# Patient Record
Sex: FEMALE
[De-identification: names, ages, dates, MRNs, and addresses within clinical notes are randomized per-mention and may not be internally consistent; named-entity substitution may affect disease eponyms.]

---

## 2024-02-12 ENCOUNTER — APPOINTMENT (OUTPATIENT)
Dept: OBGYN | Facility: CLINIC | Age: 44
End: 2024-02-12

## 2024-03-18 PROBLEM — Z00.00 ENCOUNTER FOR PREVENTIVE HEALTH EXAMINATION: Status: ACTIVE | Noted: 2024-03-18

## 2024-03-21 ENCOUNTER — APPOINTMENT (OUTPATIENT)
Dept: OBGYN | Facility: CLINIC | Age: 44
End: 2024-03-21
Payer: COMMERCIAL

## 2024-03-21 VITALS
SYSTOLIC BLOOD PRESSURE: 118 MMHG | HEART RATE: 79 BPM | BODY MASS INDEX: 24.11 KG/M2 | WEIGHT: 150 LBS | HEIGHT: 66 IN | DIASTOLIC BLOOD PRESSURE: 71 MMHG

## 2024-03-21 DIAGNOSIS — B97.7 PAPILLOMAVIRUS AS THE CAUSE OF DISEASES CLASSIFIED ELSEWHERE: ICD-10-CM

## 2024-03-21 DIAGNOSIS — R39.15 URGENCY OF URINATION: ICD-10-CM

## 2024-03-21 DIAGNOSIS — N89.8 OTHER SPECIFIED NONINFLAMMATORY DISORDERS OF VAGINA: ICD-10-CM

## 2024-03-21 DIAGNOSIS — Z78.9 OTHER SPECIFIED HEALTH STATUS: ICD-10-CM

## 2024-03-21 DIAGNOSIS — Z82.49 FAMILY HISTORY OF ISCHEMIC HEART DISEASE AND OTHER DISEASES OF THE CIRCULATORY SYSTEM: ICD-10-CM

## 2024-03-21 DIAGNOSIS — N39.3 STRESS INCONTINENCE (FEMALE) (MALE): ICD-10-CM

## 2024-03-21 DIAGNOSIS — Z01.419 ENCOUNTER FOR GYNECOLOGICAL EXAMINATION (GENERAL) (ROUTINE) W/OUT ABNORMAL FINDINGS: ICD-10-CM

## 2024-03-21 PROCEDURE — 99386 PREV VISIT NEW AGE 40-64: CPT

## 2024-03-24 PROBLEM — N39.3 STRESS INCONTINENCE OF URINE: Status: ACTIVE | Noted: 2024-03-24

## 2024-03-24 NOTE — PHYSICAL EXAM
[Alert] : alert [Appropriately responsive] : appropriately responsive [No Lymphadenopathy] : no lymphadenopathy [No Acute Distress] : no acute distress [Soft] : soft [Non-distended] : non-distended [Non-tender] : non-tender [No HSM] : No HSM [No Lesions] : no lesions [No Mass] : no mass [Oriented x3] : oriented x3 [Examination Of The Breasts] : a normal appearance [No Discharge] : no discharge [No Masses] : no breast masses were palpable [Labia Majora] : normal [Labia Minora] : normal [Normal] : normal [Uterine Adnexae] : normal [Cystocele] : a cystocele [Rectocele] : a rectocele

## 2024-03-24 NOTE — DISCUSSION/SUMMARY
[FreeTextEntry1] : 43-year-old P 3 annual GYN, HOLGER, cystocele Pap HPV Mammogram urogyn referral fro HOLGER - bulkamed vs tvt/tot d/w pt replacement iud next years

## 2024-03-24 NOTE — HISTORY OF PRESENT ILLNESS
[Patient reported PAP Smear was normal] : Patient reported PAP Smear was normal [N] : Patient reports normal menses [Y] : Patient is sexually active [TextBox_4] : GYNHX No history of fibroids, cysts, or STDs ParaGard IUD 9 years Last Pap 2023 normal [Mammogramdate] : 2021 [PapSmeardate] : 2023 [ColonoscopyDate] : never [LMPDate] : 2-1-2024 [PGHxTotal] : 3 [[facility]] : 3 [[facility] Center[facility]] : 3 [FreeTextEntry1] :

## 2024-03-26 LAB — HPV HIGH+LOW RISK DNA PNL CVX: NOT DETECTED

## 2024-03-28 LAB
A VAGINAE DNA VAG QL NAA+PROBE: NORMAL
BVAB2 DNA VAG QL NAA+PROBE: NORMAL
C KRUSEI DNA VAG QL NAA+PROBE: NEGATIVE
CANDIDA DNA VAG QL NAA+PROBE: NEGATIVE
MEGA1 DNA VAG QL NAA+PROBE: NORMAL
T VAGINALIS RRNA SPEC QL NAA+PROBE: NORMAL

## 2024-04-09 ENCOUNTER — NON-APPOINTMENT (OUTPATIENT)
Age: 44
End: 2024-04-09

## 2024-04-17 ENCOUNTER — APPOINTMENT (OUTPATIENT)
Dept: OBGYN | Facility: CLINIC | Age: 44
End: 2024-04-17

## 2024-06-17 ENCOUNTER — NON-APPOINTMENT (OUTPATIENT)
Age: 44
End: 2024-06-17

## 2024-06-18 ENCOUNTER — APPOINTMENT (OUTPATIENT)
Dept: OBGYN | Facility: CLINIC | Age: 44
End: 2024-06-18
Payer: COMMERCIAL

## 2024-06-18 ENCOUNTER — ASOB RESULT (OUTPATIENT)
Age: 44
End: 2024-06-18

## 2024-06-18 DIAGNOSIS — N92.6 IRREGULAR MENSTRUATION, UNSPECIFIED: ICD-10-CM

## 2024-06-18 DIAGNOSIS — R87.615 UNSATISFACTORY CYTOLOGIC SMEAR OF CERVIX: ICD-10-CM

## 2024-06-18 DIAGNOSIS — B00.9 HERPESVIRAL INFECTION, UNSPECIFIED: ICD-10-CM

## 2024-06-18 PROCEDURE — ZZZZZ: CPT

## 2024-06-18 PROCEDURE — 76830 TRANSVAGINAL US NON-OB: CPT

## 2024-06-18 PROCEDURE — 99213 OFFICE O/P EST LOW 20 MIN: CPT | Mod: NC,25

## 2024-06-18 RX ORDER — VALACYCLOVIR 500 MG/1
500 TABLET, FILM COATED ORAL TWICE DAILY
Qty: 60 | Refills: 2 | Status: ACTIVE | COMMUNITY
Start: 2024-06-18 | End: 1900-01-01

## 2024-06-22 LAB
ANTI-MUELLERIAN HORMONE: 0.03 NG/ML
ESTRADIOL SERPL-MCNC: 54 PG/ML
FSH SERPL-MCNC: 48 IU/L
LH SERPL-ACNC: 45 IU/L
PROGEST SERPL-MCNC: 0.39 NG/ML
PROLACTIN SERPL-MCNC: 6.8 NG/ML
SHBG SERPL-SCNC: 64.6 NMOL/L
T3FREE SERPL-MCNC: 2.63 PG/ML
T4 FREE SERPL-MCNC: 1.1 NG/DL
TESTOST FREE SERPL-MCNC: 0.1 PG/ML
TESTOST SERPL-MCNC: 14 NG/DL
THYROGLOB AB SERPL-ACNC: <20 IU/ML
THYROPEROXIDASE AB SERPL IA-ACNC: 13 IU/ML
TSH SERPL-ACNC: 1.99 UIU/ML

## 2024-06-23 NOTE — DISCUSSION/SUMMARY
[FreeTextEntry1] : 43 yo p3 irregular cycles, hsv outbreaks, unsatisfactory pap for repap -valtrex 500mg bid -hormonal w/up  - pap ,

## 2024-06-23 NOTE — HISTORY OF PRESENT ILLNESS
[FreeTextEntry1] : 42 yo p 3  Patient is here for repeat Pap due to unsatisfactory  smear. irregular periods . recurrent hsv outbreaks with paraguard iud,  [TextBox_4] : GYNHX No history of fibroids, cysts, or STDs

## 2024-06-24 LAB
ANDROST SERPL-MCNC: 63 NG/DL
CYTOLOGY CVX/VAG DOC THIN PREP: NORMAL
DHEA SERPL-MCNC: 183 NG/DL

## 2024-07-15 ENCOUNTER — APPOINTMENT (OUTPATIENT)
Dept: UROGYNECOLOGY | Facility: CLINIC | Age: 44
End: 2024-07-15
Payer: COMMERCIAL

## 2024-07-15 VITALS
BODY MASS INDEX: 24.43 KG/M2 | HEART RATE: 65 BPM | WEIGHT: 152 LBS | SYSTOLIC BLOOD PRESSURE: 113 MMHG | HEIGHT: 66 IN | DIASTOLIC BLOOD PRESSURE: 72 MMHG

## 2024-07-15 DIAGNOSIS — N39.3 STRESS INCONTINENCE (FEMALE) (MALE): ICD-10-CM

## 2024-07-15 DIAGNOSIS — N94.10 UNSPECIFIED DYSPAREUNIA: ICD-10-CM

## 2024-07-15 DIAGNOSIS — Z87.448 PERSONAL HISTORY OF OTHER DISEASES OF URINARY SYSTEM: ICD-10-CM

## 2024-07-15 DIAGNOSIS — N39.41 URGE INCONTINENCE: ICD-10-CM

## 2024-07-15 DIAGNOSIS — N81.10 CYSTOCELE, UNSPECIFIED: ICD-10-CM

## 2024-07-15 PROCEDURE — 51701 INSERT BLADDER CATHETER: CPT

## 2024-07-15 PROCEDURE — 99459 PELVIC EXAMINATION: CPT

## 2024-07-15 PROCEDURE — 99205 OFFICE O/P NEW HI 60 MIN: CPT | Mod: 25

## 2024-07-15 RX ORDER — ELECTROLYTES/DEXTROSE
SOLUTION, ORAL ORAL
Refills: 0 | Status: ACTIVE | COMMUNITY

## 2024-07-16 LAB
APPEARANCE: CLEAR
BILIRUBIN URINE: NEGATIVE
BLOOD URINE: NEGATIVE
COLOR: YELLOW
GLUCOSE QUALITATIVE U: NEGATIVE MG/DL
KETONES URINE: NEGATIVE MG/DL
LEUKOCYTE ESTERASE URINE: NEGATIVE
NITRITE URINE: NEGATIVE
PH URINE: 7
PROTEIN URINE: NEGATIVE MG/DL
SPECIFIC GRAVITY URINE: 1.01
UROBILINOGEN URINE: 0.2 MG/DL

## 2024-07-18 LAB — URINE CULTURE <10: NORMAL

## 2024-09-12 ENCOUNTER — RESULT CHARGE (OUTPATIENT)
Age: 44
End: 2024-09-12

## 2024-09-12 ENCOUNTER — APPOINTMENT (OUTPATIENT)
Dept: UROGYNECOLOGY | Facility: CLINIC | Age: 44
End: 2024-09-12
Payer: COMMERCIAL

## 2024-09-12 VITALS
SYSTOLIC BLOOD PRESSURE: 94 MMHG | HEIGHT: 66 IN | WEIGHT: 152 LBS | DIASTOLIC BLOOD PRESSURE: 60 MMHG | BODY MASS INDEX: 24.43 KG/M2 | HEART RATE: 68 BPM

## 2024-09-12 DIAGNOSIS — N39.3 STRESS INCONTINENCE (FEMALE) (MALE): ICD-10-CM

## 2024-09-12 PROCEDURE — 51729 CYSTOMETROGRAM W/VP&UP: CPT

## 2024-09-12 PROCEDURE — 81000 URINALYSIS NONAUTO W/SCOPE: CPT

## 2024-09-12 PROCEDURE — 51784 ANAL/URINARY MUSCLE STUDY: CPT

## 2024-09-12 PROCEDURE — 51741 ELECTRO-UROFLOWMETRY FIRST: CPT

## 2024-09-12 PROCEDURE — 81025 URINE PREGNANCY TEST: CPT

## 2024-09-12 PROCEDURE — 51797 INTRAABDOMINAL PRESSURE TEST: CPT

## 2024-09-13 LAB
BILIRUB UR QL STRIP: NEGATIVE
CLARITY UR: CLEAR
COLLECTION METHOD: NORMAL
GLUCOSE UR-MCNC: NEGATIVE
HCG UR QL: 0.2 EU/DL
HCG UR QL: NEGATIVE
HGB UR QL STRIP.AUTO: NEGATIVE
KETONES UR-MCNC: NEGATIVE
LEUKOCYTE ESTERASE UR QL STRIP: NEGATIVE
NITRITE UR QL STRIP: NEGATIVE
PH UR STRIP: 7
PROT UR STRIP-MCNC: NEGATIVE
QUALITY CONTROL: YES
SP GR UR STRIP: 1.01

## 2024-09-18 ENCOUNTER — APPOINTMENT (OUTPATIENT)
Dept: UROGYNECOLOGY | Facility: CLINIC | Age: 44
End: 2024-09-18
Payer: COMMERCIAL

## 2024-09-18 VITALS
HEART RATE: 68 BPM | SYSTOLIC BLOOD PRESSURE: 106 MMHG | HEIGHT: 66 IN | BODY MASS INDEX: 24.43 KG/M2 | WEIGHT: 152 LBS | DIASTOLIC BLOOD PRESSURE: 72 MMHG

## 2024-09-18 DIAGNOSIS — N39.3 STRESS INCONTINENCE (FEMALE) (MALE): ICD-10-CM

## 2024-09-18 DIAGNOSIS — N39.41 URGE INCONTINENCE: ICD-10-CM

## 2024-09-18 DIAGNOSIS — R39.15 URGENCY OF URINATION: ICD-10-CM

## 2024-09-18 DIAGNOSIS — N81.10 CYSTOCELE, UNSPECIFIED: ICD-10-CM

## 2024-09-18 PROCEDURE — 99215 OFFICE O/P EST HI 40 MIN: CPT

## 2024-09-18 PROCEDURE — G2211 COMPLEX E/M VISIT ADD ON: CPT | Mod: NC

## 2024-09-23 RX ORDER — COPPER 313.4 MG/1
INTRAUTERINE DEVICE INTRAUTERINE
Qty: 1 | Refills: 0 | Status: ACTIVE | COMMUNITY
Start: 2024-09-13

## 2024-09-23 NOTE — COUNSELING
[FreeTextEntry1] : 1.Your surgery will be as follows: midurethral sling, cystoscopy, anterior repair, posterior repair 2. Your surgery will be done through the vagina 3. You will need to have bloodwork done before your surgery (we will give you more instructions regarding this). 4. After surgery, you can take Motrin 600mg every 6 hours and Tylenol 650mg every 6 hours. If these don't alleviate your pain, you can also take the stronger prescribed pain medication as instructed. 5. Please use Miralax to avoid constipation after your surgery. Start the day after surgery and use a capful each day until you see me for your postoperative visit.  6. No heavy lifting and nothing in the vagina for 4 weeks after surgery. 7. Overall recovery time after surgery is around 4 weeks. 8. You will have a catheter draining your bladder when you wake up after surgery and there is a chance you may need to go home with the catheter for a few days. You will then return to the office to have it removed. 9. You will need a 2 week postoperative appointment and then a 3 month postoperative visit with me after your surgery. 10. Maritza Mart will contact you with your presurgical testing date. If you have any questions/concerns, please call [phone].

## 2024-09-23 NOTE — COUNSELING
[FreeTextEntry1] : 1.Your surgery will be as follows: midurethral sling, cystoscopy, anterior repair, posterior repair 2. Your surgery will be done through the vagina 3. You will need to have bloodwork done before your surgery (we will give you more instructions regarding this). 4. After surgery, you can take Motrin 600mg every 6 hours and Tylenol 650mg every 6 hours. If these don't alleviate your pain, you can also take the stronger prescribed pain medication as instructed. 5. Please use Miralax to avoid constipation after your surgery. Start the day after surgery and use a capful each day until you see me for your postoperative visit.  6. No heavy lifting and nothing in the vagina for 4 weeks after surgery. 7. Overall recovery time after surgery is around 4 weeks. 8. You will have a catheter draining your bladder when you wake up after surgery and there is a chance you may need to go home with the catheter for a few days. You will then return to the office to have it removed. 9. You will need a 2 week postoperative appointment and then a 3 month postoperative visit with me after your surgery. 10. Maritza Mart will contact you with your presurgical testing date. If you have any questions/concerns, please call [phone].

## 2024-09-23 NOTE — HISTORY OF PRESENT ILLNESS
[FreeTextEntry1] : 44 year old with Holger>>UUI, stage 2 anterior vaginal wall prolapse, and dyspareunia. She reports no change in her symptoms and desires surgical management for her HOLGER.  2024 - UDS: + HOLGER no ISD no DO no sensory urgency normal uroflow interrupted PFS EMG WNL    Voids 10/ day, 0-1/night. occasional urgency, 7 HOLGER/week, 1-2 UUI/week, no UTI's in past 6 months, no bladder diet. Occasional vaginal/pelvic pain. + sexually active.   From initial visit: 44 year para 3 ( x3) presents with complaints of urinary leakage with running, jumping. This affects her quality of life. She also feels a little bulge when lifting heavy things. She noticed this when she was weight-lifting in the gym.  Pelvic organ prolapse: + bulge, + pressure/heaviness, duration 10 years Stress urinary incontinence: 7 x/week no prior incontinence procedures Overactive bladder syndrome: daily frequency 10 x/day, 0-1 x/night, occasional urgency, 1-2 x/week UUI episodes, no pads/day (occasional changes underwear) Bladder irritants include coffee, Prior OAB meds no Voiding dysfunction: no Incomplete bladder emptying, no hesitancy Lower urinary tract/vaginal symptoms: no UTIs per year, no hematuria, no dysuria, no bladder pain 7 BM/week no constipation Fecal incontinence no Sexually active + Dyspareunia occasional Pelvic pain no Vaginal dryness no LMP May 2024, with copper IUD in place

## 2024-09-23 NOTE — ASSESSMENT
[FreeTextEntry1] : HOLGER - We once again reviewed treatment options including pelvic floor PT, anti-incontinence devices, and surgery. Patient would like to proceed with surgery. She was counseled regarding the following:  Retropubic midurethral sling and cystoscopy: .About 1 hour operation under general anesthesia with Same day discharge, success rate of 85%. . Risk of mesh exposure into vagina, bladder, bowel, or chronic pain due to the mesh less than 2% which might require reoperation for partial or complete removal of the mesh.  Risk of intraoperative injury to the bowel, bladder, ureters, fistula formation, hematoma bleeding, infection and urinary retention less than 2%. Risk of midurethral sling related urinary retention requiring prolonged catheterization and then reoperation for release or removal of the sling very rare 0.5%.  Also made it clear to the patient that this is not a treatment for urinary urgency, frequency, nocturia or urgency related incontinence.  We discussed that if she continues to experience activity related stress urinary incontinence/urine leak with exercise or cough she might require periurethral bulking agents or a repeat midurethral sling down the road.   Of note, we discussed that depending on her anatomy, she may require an anterior repair, SSLF, posterior repair as needed in order to prevent possible obstruction with sling.  Patient will select a surgical date that works best for her and will get back to us.  Anterior vaginal wall prolapse - Discussed R/B/A of anterior repair, SSLF, posterior repair. Discussed with patient that this will be performed on as needed basis depending on findings while performing EUA and if prolapse is deemed to potentially obstruct urination with sling placement.  UUI - Made it clear to the patient that the Midurethral sling does not treat urinary urgency, frequency, nocturia or urgency related incontinence. These conditions will have to be addressed later on with bladder diet, oral medications and possible bladder Botox.

## 2024-09-23 NOTE — REASON FOR VISIT
[TextEntry] : Reason for visit: Surgical Counseling Voids per day:   every 1-2 hours Voids per night:   0-1 Urge incontinence: No Stress incontinence: Yes (+) cough (+) sneeze (+) jumping (+) running Constipation: No Fecal incontinence: No    Vaginal bulge: Occasional

## 2024-10-17 ENCOUNTER — ASOB RESULT (OUTPATIENT)
Age: 44
End: 2024-10-17

## 2024-10-17 ENCOUNTER — APPOINTMENT (OUTPATIENT)
Dept: OBGYN | Facility: CLINIC | Age: 44
End: 2024-10-17
Payer: COMMERCIAL

## 2024-10-17 VITALS
SYSTOLIC BLOOD PRESSURE: 114 MMHG | WEIGHT: 152 LBS | TEMPERATURE: 98.6 F | DIASTOLIC BLOOD PRESSURE: 73 MMHG | HEART RATE: 68 BPM | BODY MASS INDEX: 24.43 KG/M2 | HEIGHT: 66 IN

## 2024-10-17 DIAGNOSIS — Z30.430 ENCOUNTER FOR INSERTION OF INTRAUTERINE CONTRACEPTIVE DEVICE: ICD-10-CM

## 2024-10-17 LAB
HCG UR QL: NEGATIVE
QUALITY CONTROL: YES

## 2024-10-17 PROCEDURE — 76998 US GUIDE INTRAOP: CPT

## 2024-10-17 PROCEDURE — 58300 INSERT INTRAUTERINE DEVICE: CPT

## 2024-10-17 PROCEDURE — 58301 REMOVE INTRAUTERINE DEVICE: CPT

## 2024-10-17 PROCEDURE — 81025 URINE PREGNANCY TEST: CPT

## 2024-10-17 RX ORDER — VALACYCLOVIR 500 MG/1
500 TABLET, FILM COATED ORAL TWICE DAILY
Qty: 12 | Refills: 3 | Status: ACTIVE | COMMUNITY
Start: 2024-10-17 | End: 1900-01-01

## 2024-10-25 DIAGNOSIS — R30.0 DYSURIA: ICD-10-CM

## 2024-10-25 DIAGNOSIS — R35.0 FREQUENCY OF MICTURITION: ICD-10-CM

## 2024-10-29 ENCOUNTER — APPOINTMENT (OUTPATIENT)
Dept: OBGYN | Facility: CLINIC | Age: 44
End: 2024-10-29
Payer: COMMERCIAL

## 2024-10-29 DIAGNOSIS — N39.41 URGE INCONTINENCE: ICD-10-CM

## 2024-10-29 PROCEDURE — 99442: CPT

## 2024-10-29 RX ORDER — SULFAMETHOXAZOLE AND TRIMETHOPRIM 800; 160 MG/1; MG/1
800-160 TABLET ORAL TWICE DAILY
Qty: 6 | Refills: 0 | Status: ACTIVE | COMMUNITY
Start: 2024-10-29 | End: 1900-01-01

## 2024-10-30 LAB — BACTERIA UR CULT: ABNORMAL

## 2024-12-18 ENCOUNTER — APPOINTMENT (OUTPATIENT)
Dept: OBGYN | Facility: CLINIC | Age: 44
End: 2024-12-18

## 2024-12-27 ENCOUNTER — APPOINTMENT (OUTPATIENT)
Dept: OBGYN | Facility: CLINIC | Age: 44
End: 2024-12-27

## 2024-12-27 VITALS
HEART RATE: 71 BPM | HEIGHT: 66 IN | SYSTOLIC BLOOD PRESSURE: 126 MMHG | WEIGHT: 155 LBS | DIASTOLIC BLOOD PRESSURE: 76 MMHG | BODY MASS INDEX: 24.91 KG/M2

## 2024-12-27 DIAGNOSIS — R68.82 DECREASED LIBIDO: ICD-10-CM

## 2024-12-27 PROCEDURE — 99213 OFFICE O/P EST LOW 20 MIN: CPT | Mod: 25

## 2024-12-27 PROCEDURE — 76830 TRANSVAGINAL US NON-OB: CPT

## 2024-12-27 RX ORDER — BREMELANOTIDE 1.75 MG/.3ML
1.75 INJECTION SUBCUTANEOUS
Qty: 8 | Refills: 1 | Status: ACTIVE | COMMUNITY
Start: 2024-12-27 | End: 1900-01-01

## 2025-01-14 ENCOUNTER — APPOINTMENT (OUTPATIENT)
Dept: UROGYNECOLOGY | Facility: CLINIC | Age: 45
End: 2025-01-14

## 2025-02-04 ENCOUNTER — APPOINTMENT (OUTPATIENT)
Dept: ORTHOPEDIC SURGERY | Facility: CLINIC | Age: 45
End: 2025-02-04
Payer: COMMERCIAL

## 2025-02-04 DIAGNOSIS — S63.681A OTHER SPRAIN OF RIGHT THUMB, INITIAL ENCOUNTER: ICD-10-CM

## 2025-02-04 PROCEDURE — 73140 X-RAY EXAM OF FINGER(S): CPT | Mod: RT

## 2025-02-04 PROCEDURE — 99203 OFFICE O/P NEW LOW 30 MIN: CPT | Mod: 25

## 2025-02-04 PROCEDURE — L3809: CPT | Mod: RT

## 2025-02-13 ENCOUNTER — OUTPATIENT (OUTPATIENT)
Dept: OUTPATIENT SERVICES | Facility: HOSPITAL | Age: 45
LOS: 1 days | End: 2025-02-13
Payer: COMMERCIAL

## 2025-02-13 VITALS
TEMPERATURE: 99 F | OXYGEN SATURATION: 99 % | SYSTOLIC BLOOD PRESSURE: 109 MMHG | RESPIRATION RATE: 18 BRPM | WEIGHT: 154.98 LBS | HEART RATE: 70 BPM | DIASTOLIC BLOOD PRESSURE: 77 MMHG | HEIGHT: 66 IN

## 2025-02-13 DIAGNOSIS — N81.0 URETHROCELE: ICD-10-CM

## 2025-02-13 DIAGNOSIS — Z01.818 ENCOUNTER FOR OTHER PREPROCEDURAL EXAMINATION: ICD-10-CM

## 2025-02-13 LAB
ALBUMIN SERPL ELPH-MCNC: 4.4 G/DL — SIGNIFICANT CHANGE UP (ref 3.5–5.2)
ALP SERPL-CCNC: 52 U/L — SIGNIFICANT CHANGE UP (ref 30–115)
ALT FLD-CCNC: 12 U/L — SIGNIFICANT CHANGE UP (ref 0–41)
ANION GAP SERPL CALC-SCNC: 10 MMOL/L — SIGNIFICANT CHANGE UP (ref 7–14)
APPEARANCE UR: CLEAR — SIGNIFICANT CHANGE UP
AST SERPL-CCNC: 16 U/L — SIGNIFICANT CHANGE UP (ref 0–41)
BASOPHILS # BLD AUTO: 0.05 K/UL — SIGNIFICANT CHANGE UP (ref 0–0.2)
BASOPHILS NFR BLD AUTO: 0.7 % — SIGNIFICANT CHANGE UP (ref 0–1)
BILIRUB SERPL-MCNC: 0.2 MG/DL — SIGNIFICANT CHANGE UP (ref 0.2–1.2)
BILIRUB UR-MCNC: NEGATIVE — SIGNIFICANT CHANGE UP
BUN SERPL-MCNC: 18 MG/DL — SIGNIFICANT CHANGE UP (ref 10–20)
CALCIUM SERPL-MCNC: 9.2 MG/DL — SIGNIFICANT CHANGE UP (ref 8.4–10.5)
CHLORIDE SERPL-SCNC: 106 MMOL/L — SIGNIFICANT CHANGE UP (ref 98–110)
CO2 SERPL-SCNC: 25 MMOL/L — SIGNIFICANT CHANGE UP (ref 17–32)
COLOR SPEC: YELLOW — SIGNIFICANT CHANGE UP
CREAT SERPL-MCNC: 0.9 MG/DL — SIGNIFICANT CHANGE UP (ref 0.7–1.5)
DIFF PNL FLD: NEGATIVE — SIGNIFICANT CHANGE UP
EGFR: 80 ML/MIN/1.73M2 — SIGNIFICANT CHANGE UP
EOSINOPHIL # BLD AUTO: 0.09 K/UL — SIGNIFICANT CHANGE UP (ref 0–0.7)
EOSINOPHIL NFR BLD AUTO: 1.3 % — SIGNIFICANT CHANGE UP (ref 0–8)
GLUCOSE SERPL-MCNC: 103 MG/DL — HIGH (ref 70–99)
GLUCOSE UR QL: NEGATIVE MG/DL — SIGNIFICANT CHANGE UP
HCT VFR BLD CALC: 40 % — SIGNIFICANT CHANGE UP (ref 37–47)
HGB BLD-MCNC: 13.4 G/DL — SIGNIFICANT CHANGE UP (ref 12–16)
IMM GRANULOCYTES NFR BLD AUTO: 0.1 % — SIGNIFICANT CHANGE UP (ref 0.1–0.3)
KETONES UR-MCNC: NEGATIVE MG/DL — SIGNIFICANT CHANGE UP
LEUKOCYTE ESTERASE UR-ACNC: NEGATIVE — SIGNIFICANT CHANGE UP
LYMPHOCYTES # BLD AUTO: 3.25 K/UL — SIGNIFICANT CHANGE UP (ref 1.2–3.4)
LYMPHOCYTES # BLD AUTO: 46.4 % — SIGNIFICANT CHANGE UP (ref 20.5–51.1)
MCHC RBC-ENTMCNC: 28.6 PG — SIGNIFICANT CHANGE UP (ref 27–31)
MCHC RBC-ENTMCNC: 33.5 G/DL — SIGNIFICANT CHANGE UP (ref 32–37)
MCV RBC AUTO: 85.5 FL — SIGNIFICANT CHANGE UP (ref 81–99)
MONOCYTES # BLD AUTO: 0.38 K/UL — SIGNIFICANT CHANGE UP (ref 0.1–0.6)
MONOCYTES NFR BLD AUTO: 5.4 % — SIGNIFICANT CHANGE UP (ref 1.7–9.3)
NEUTROPHILS # BLD AUTO: 3.22 K/UL — SIGNIFICANT CHANGE UP (ref 1.4–6.5)
NEUTROPHILS NFR BLD AUTO: 46.1 % — SIGNIFICANT CHANGE UP (ref 42.2–75.2)
NITRITE UR-MCNC: NEGATIVE — SIGNIFICANT CHANGE UP
NRBC BLD AUTO-RTO: 0 /100 WBCS — SIGNIFICANT CHANGE UP (ref 0–0)
PH UR: 7 — SIGNIFICANT CHANGE UP (ref 5–8)
PLATELET # BLD AUTO: 269 K/UL — SIGNIFICANT CHANGE UP (ref 130–400)
PMV BLD: 10.7 FL — HIGH (ref 7.4–10.4)
POTASSIUM SERPL-MCNC: 4.3 MMOL/L — SIGNIFICANT CHANGE UP (ref 3.5–5)
POTASSIUM SERPL-SCNC: 4.3 MMOL/L — SIGNIFICANT CHANGE UP (ref 3.5–5)
PROT SERPL-MCNC: 7.2 G/DL — SIGNIFICANT CHANGE UP (ref 6–8)
PROT UR-MCNC: NEGATIVE MG/DL — SIGNIFICANT CHANGE UP
RBC # BLD: 4.68 M/UL — SIGNIFICANT CHANGE UP (ref 4.2–5.4)
RBC # FLD: 13.7 % — SIGNIFICANT CHANGE UP (ref 11.5–14.5)
SODIUM SERPL-SCNC: 141 MMOL/L — SIGNIFICANT CHANGE UP (ref 135–146)
SP GR SPEC: 1.02 — SIGNIFICANT CHANGE UP (ref 1–1.03)
UROBILINOGEN FLD QL: 0.2 MG/DL — SIGNIFICANT CHANGE UP (ref 0.2–1)
WBC # BLD: 7 K/UL — SIGNIFICANT CHANGE UP (ref 4.8–10.8)
WBC # FLD AUTO: 7 K/UL — SIGNIFICANT CHANGE UP (ref 4.8–10.8)

## 2025-02-13 PROCEDURE — 80053 COMPREHEN METABOLIC PANEL: CPT

## 2025-02-13 PROCEDURE — 87086 URINE CULTURE/COLONY COUNT: CPT

## 2025-02-13 PROCEDURE — 81003 URINALYSIS AUTO W/O SCOPE: CPT

## 2025-02-13 PROCEDURE — 36415 COLL VENOUS BLD VENIPUNCTURE: CPT

## 2025-02-13 PROCEDURE — 99214 OFFICE O/P EST MOD 30 MIN: CPT | Mod: 25

## 2025-02-13 PROCEDURE — 85025 COMPLETE CBC W/AUTO DIFF WBC: CPT

## 2025-02-13 PROCEDURE — 87186 SC STD MICRODIL/AGAR DIL: CPT

## 2025-02-13 NOTE — H&P PST ADULT - NSICDXPASTMEDICALHX_GEN_ALL_CORE_FT
PAST MEDICAL HISTORY:  Female dyspareunia     Presence of IUD     Prolapse of vaginal wall     Stress incontinence

## 2025-02-13 NOTE — H&P PST ADULT - REASON FOR ADMISSION
MIDURETHRAL SLING, CYSTOSCOPY, POSSIBLE ANTERIOR/POSTERIOR REPAIR, POSSIBLE SACROSPINOUS LIGAMENT FIXATION.

## 2025-02-13 NOTE — H&P PST ADULT - HISTORY OF PRESENT ILLNESS
PATIENT/GUARDIAN CURRENTLY DENIES CHEST PAIN  SHORTNESS OF BREATH  PALPITATIONS,  DYSURIA, OR UPPER RESPIRATORY INFECTION IN PAST 2 WEEKS  denies travel outside the [place] in the past 30 days    Anesthesia Alert  NO--Difficult Airway  NO--History of neck surgery or radiation  NO--Limited ROM of neck  NO--History of Malignant hyperthermia  NO--No personal or family history of Pseudocholinesterase deficiency.  NO--Prior Anesthesia Complication  NO--Latex Allergy  NO--Loose teeth  NO--History of Rheumatoid Arthritis  NO--Bleeding risk  NO--LITO  NO--Other_____    PT/GUARDIAN DENIES ANY RASHES, ABRASION, OR OPEN WOUNDS OR CUTS    AS PER THE PT/GUARDIAN, THIS IS HIS/HER COMPLETE MEDICAL AND SURGICAL HX, INCLUDING MEDICATIONS PRESCRIBED AND OVER THE COUNTER    Patient/guardian understands the instructions and was given the opportunity to ask questions and have them answered.    pt/guardian denies any suicidal ideation or thoughts, pt states not a threat to self or others      Duke Activity Status Index (DASI)      Revised Cardiac Risk Index for Pre-Operative Risk    Opioid Risk Assessment Tool (Female)       Family history of substance abuse            Alcohol (1)            Illegal Drugs (2)            Prescription drugs (4)       Personal history of substance abuse            Alcohol (3)            Illegal Drugs (4)            Prescription drugs (5)       Age between 16-45 (1)       History of preadolescent sexual abuse (3)       Psychological disease (ADD, ADHD, OCD, Bipolar Disorder, Schizophrenia, Depression) (2)    Scoring Totals:  Low Risk (0-3)  Moderate Risk (4-7)  High Risk (>/=8)   44 y.o. F ()presents for MIDURETHRAL SLING, CYSTOSCOPY, POSSIBLE ANTERIOR/POSTERIOR REPAIR, POSSIBLE SACROSPINOUS LIGAMENT FIXATION. She admits has had stress incontinence for 10 years which affects her quality of life. She also admits she feels a bulge when she lifts heavy things and occasional urgency. Patient denies any recent UTIS or hx of recurrent UTIS.  She admits she has an IUD in place. Denies CP, SOB, headache, dizziness, fever, hematuria, vaginal discharge/lesions, lower abdominal pain, back pain, heavy uterine bleeding, kidney problems, urinary difficulty, and N/V/D.   PAST MEDICAL & SURGICAL HISTORY:  Stress incontinence      Prolapse of vaginal wall      Presence of IUD      Female dyspareunia          PATIENT/GUARDIAN CURRENTLY DENIES CHEST PAIN  SHORTNESS OF BREATH  PALPITATIONS,  DYSURIA, OR UPPER RESPIRATORY INFECTION IN PAST 2 WEEKS  denies travel outside the [place] in the past 30 days    Anesthesia Alert  NO--Difficult Airway  NO--History of neck surgery or radiation  NO--Limited ROM of neck  NO--History of Malignant hyperthermia  NO--No personal or family history of Pseudocholinesterase deficiency.  NO--Prior Anesthesia Complication  NO--Latex Allergy  NO--Loose teeth  NO--History of Rheumatoid Arthritis  NO--Bleeding risk  NO--LITO  NO--Other_____    PT/GUARDIAN DENIES ANY RASHES, ABRASION, OR OPEN WOUNDS OR CUTS    AS PER THE PT/GUARDIAN, THIS IS HIS/HER COMPLETE MEDICAL AND SURGICAL HX, INCLUDING MEDICATIONS PRESCRIBED AND OVER THE COUNTER    Patient/guardian understands the instructions and was given the opportunity to ask questions and have them answered.    pt/guardian denies any suicidal ideation or thoughts, pt states not a threat to self or others      Duke Activity Status Index (DASI)  Duke Activity Status Index (DASI) from [employer]  on 2025  ** All calculations should be rechecked by clinician prior to use **    RESULT SUMMARY:  58.2 points  The higher the score (maximum 58.2), the higher the functional status.    9.89 METs        INPUTS:  Take care of self —> 2.75 = Yes  Walk indoors —> 1.75 = Yes  Walk 1&ndash;2 blocks on level ground —> 2.75 = Yes  Climb a flight of stairs or walk up a hill —> 5.5 = Yes  Run a short distance —> 8 = Yes  Do light work around the house —> 2.7 = Yes  Do moderate work around the house —> 3.5 = Yes  Do heavy work around the house —> 8 = Yes  Do yardwork —> 4.5 = Yes  Have sexual relations —> 5.25 = Yes  Participate in moderate recreational activities —> 6 = Yes  Participate in strenuous sports —> 7.5 = Yes    Revised Cardiac Risk Index for Pre-Operative Risk from [employer]  on 2025  ** All calculations should be rechecked by clinician prior to use **    Revised Cardiac Risk Index for Pre-Operative Risk  RESULT SUMMARY:  0 points  RCRI Score    3.9 %  Risk of major cardiac event      INPUTS:  High-risk surgery —> 0 = No  History of ischemic heart disease —> 0 = No  History of congestive heart failure —> 0 = No  History of cerebrovascular disease —> 0 = No  Pre-operative treatment with insulin —> 0 = No  Pre-operative creatinine >2 mg/dL / 176.8 µmol/L —> 0 = No    Opioid Risk Assessment Tool (Female)       Family history of substance abuse            Alcohol (1)            Illegal Drugs (2)            Prescription drugs (4)       Personal history of substance abuse            Alcohol (3)            Illegal Drugs (4)            Prescription drugs (5)       Age between 16-45 (1)       History of preadolescent sexual abuse (3)       Psychological disease (ADD, ADHD, OCD, Bipolar Disorder, Schizophrenia, Depression) (2)    Scoring Totals:  Low Risk (0-3)  Moderate Risk (4-7)  High Risk (>/=8)  LOW RISK

## 2025-02-14 DIAGNOSIS — Z01.818 ENCOUNTER FOR OTHER PREPROCEDURAL EXAMINATION: ICD-10-CM

## 2025-02-14 DIAGNOSIS — N81.0 URETHROCELE: ICD-10-CM

## 2025-02-18 ENCOUNTER — APPOINTMENT (OUTPATIENT)
Dept: ORTHOPEDIC SURGERY | Facility: CLINIC | Age: 45
End: 2025-02-18

## 2025-02-26 ENCOUNTER — APPOINTMENT (OUTPATIENT)
Dept: ORTHOPEDIC SURGERY | Facility: CLINIC | Age: 45
End: 2025-02-26

## 2025-02-28 ENCOUNTER — APPOINTMENT (OUTPATIENT)
Dept: UROGYNECOLOGY | Facility: CLINIC | Age: 45
End: 2025-02-28
Payer: COMMERCIAL

## 2025-02-28 VITALS
SYSTOLIC BLOOD PRESSURE: 111 MMHG | HEIGHT: 66 IN | DIASTOLIC BLOOD PRESSURE: 71 MMHG | HEART RATE: 69 BPM | WEIGHT: 155 LBS | BODY MASS INDEX: 24.91 KG/M2

## 2025-02-28 DIAGNOSIS — N39.3 STRESS INCONTINENCE (FEMALE) (MALE): ICD-10-CM

## 2025-02-28 DIAGNOSIS — N81.10 CYSTOCELE, UNSPECIFIED: ICD-10-CM

## 2025-02-28 DIAGNOSIS — N39.41 URGE INCONTINENCE: ICD-10-CM

## 2025-02-28 PROCEDURE — 99215 OFFICE O/P EST HI 40 MIN: CPT

## 2025-02-28 PROCEDURE — G2211 COMPLEX E/M VISIT ADD ON: CPT | Mod: NC

## 2025-02-28 RX ORDER — NITROFURANTOIN (MONOHYDRATE/MACROCRYSTALS) 25; 75 MG/1; MG/1
100 CAPSULE ORAL
Qty: 10 | Refills: 0 | Status: COMPLETED | COMMUNITY
Start: 2025-02-18 | End: 2025-02-28

## 2025-03-01 PROBLEM — N81.10 CYSTOCELE, UNSPECIFIED: Chronic | Status: ACTIVE | Noted: 2025-02-13

## 2025-03-01 PROBLEM — Z97.5 PRESENCE OF (INTRAUTERINE) CONTRACEPTIVE DEVICE: Chronic | Status: ACTIVE | Noted: 2025-02-13

## 2025-03-01 PROBLEM — N39.3 STRESS INCONTINENCE (FEMALE) (MALE): Chronic | Status: ACTIVE | Noted: 2025-02-13

## 2025-03-01 PROBLEM — N94.10 UNSPECIFIED DYSPAREUNIA: Chronic | Status: ACTIVE | Noted: 2025-02-13

## 2025-03-06 ENCOUNTER — OUTPATIENT (OUTPATIENT)
Dept: OUTPATIENT SERVICES | Facility: HOSPITAL | Age: 45
LOS: 1 days | Discharge: HOME | End: 2025-03-06
Payer: COMMERCIAL

## 2025-03-06 VITALS
HEIGHT: 66 IN | WEIGHT: 156.09 LBS | SYSTOLIC BLOOD PRESSURE: 112 MMHG | DIASTOLIC BLOOD PRESSURE: 72 MMHG | HEART RATE: 61 BPM | TEMPERATURE: 98 F | OXYGEN SATURATION: 100 % | RESPIRATION RATE: 18 BRPM

## 2025-03-06 VITALS — HEART RATE: 62 BPM | SYSTOLIC BLOOD PRESSURE: 106 MMHG | DIASTOLIC BLOOD PRESSURE: 68 MMHG | RESPIRATION RATE: 17 BRPM

## 2025-03-06 DIAGNOSIS — N39.3 STRESS INCONTINENCE (FEMALE) (MALE): ICD-10-CM

## 2025-03-06 DIAGNOSIS — N81.0 URETHROCELE: ICD-10-CM

## 2025-03-06 PROCEDURE — 57288 REPAIR BLADDER DEFECT: CPT

## 2025-03-06 PROCEDURE — C1771: CPT

## 2025-03-06 RX ORDER — SODIUM CHLORIDE 9 G/1000ML
1000 INJECTION, SOLUTION INTRAVENOUS
Refills: 0 | Status: DISCONTINUED | OUTPATIENT
Start: 2025-03-06 | End: 2025-03-06

## 2025-03-06 RX ORDER — ACETAMINOPHEN 500 MG/5ML
3 LIQUID (ML) ORAL
Qty: 168 | Refills: 0
Start: 2025-03-06 | End: 2025-03-19

## 2025-03-06 RX ORDER — IBUPROFEN 200 MG
1 TABLET ORAL
Qty: 56 | Refills: 0
Start: 2025-03-06 | End: 2025-03-19

## 2025-03-06 RX ORDER — ONDANSETRON HCL/PF 4 MG/2 ML
4 VIAL (ML) INJECTION ONCE
Refills: 0 | Status: DISCONTINUED | OUTPATIENT
Start: 2025-03-06 | End: 2025-03-06

## 2025-03-06 RX ORDER — HYDROMORPHONE/SOD CHLOR,ISO/PF 2 MG/10 ML
0.5 SYRINGE (ML) INJECTION
Refills: 0 | Status: DISCONTINUED | OUTPATIENT
Start: 2025-03-06 | End: 2025-03-06

## 2025-03-06 RX ORDER — PHENAZOPYRIDINE HCL 100 MG
200 TABLET ORAL ONCE
Refills: 0 | Status: DISCONTINUED | OUTPATIENT
Start: 2025-03-06 | End: 2025-03-06

## 2025-03-06 NOTE — ASU PATIENT PROFILE, ADULT - FALL HARM RISK - HARM RISK INTERVENTIONS

## 2025-03-06 NOTE — ASU PATIENT PROFILE, ADULT - BARIATRIC
"Hero Lancaster (59 y.o. Male)     [id number]     Teresa Greene, RN  Utilization Review  [id number]-[phone]  [phone]        Date of Birth   1966    Social Security Number       Address   [address] [id number]    Home Phone   [phone]    MRN   [id number]       [employer]   [employer]    Marital Status   Single                            Admission Date   1/25/25    Admission Type   Emergency    Admitting Provider   Nathan Varma MD    Attending Provider       Department, Room/Bed   [facility] 6B, N627/1       Discharge Date   2/1/2025    Discharge Disposition   Home or Self Care    Discharge Destination   Home                              Attending Provider: (none)   Allergies: Shellfish Allergy, Shellfish-derived Products, Codeine    Isolation: None   Infection: MRSA (12/29/24), Campylobacter (01/30/25)   Code Status: Prior    Ht: 160 cm (62.99\")   Wt: 77.1 kg (170 lb)    Admission Cmt: None   Principal Problem: Elevated LFTs [R79.89]                   Active Insurance as of 1/25/2025       Primary Coverage       Payor Plan Insurance Group Employer/Plan Group    [facility] COMMUNITY PLAN [facility] COMMUNITY PLAN [facility]       Payor Plan Address Payor Plan Phone Number Payor Plan Fax Number Effective Dates    [address]   12/27/2024 - None Entered    [facility] [id number]         Subscriber Name Subscriber Birth Date Member ID       HERO LANCASTER 1966 [id number]                     Emergency Contacts        (Rel.) Home Phone Work Phone Mobile Phone    Danie Lancaster (Father) [phone] -- [phone]                 Discharge Summary        So De La Cruz, APRN at 02/01/25 1010       Attestation signed by Nathan Varma MD at 02/01/25 1636    I have reviewed this documentation and agree.  Attending Cosignature     I supervised care of the patient on day of service with direct care provided by the advanced care provider (APC).    Nathan ALBRIGHT " MD Kojo  25                        [facility] Medicine Services  DISCHARGE SUMMARY    Patient Name: Malcolm Costa  : 1966  MRN: [id number]    Date of Admission: 2025  2:26 PM  Date of Discharge:  2025  Primary Care Physician: Sita Chase APRN    Consults       Date and Time Order Name Status Description    2025 11:08 AM Inpatient Infectious Diseases Consult Completed     2025  5:26 PM Inpatient Gastroenterology Consult Completed             Hospital Course     Presenting Problem: weight loss, diarrhea    Active Hospital Problems    Diagnosis  POA    **Elevated LFTs [R79.89]  Yes    Severe protein-calorie malnutrition [E43]  Yes      Resolved Hospital Problems   No resolved problems to display.          Hospital Course:  Malcolm Costa is a 58 y.o. male with hemochromatosis, type 2 diabetes, hyperlipidemia, hypertension, GERD, depression and anxiety who presented to [facility] ED due to generalized weakness.  Patient has had problems with weakness and overall malaise for several months, but his symptoms worsened over the last week. Patient has also had 60-lb unintentional weight loss, nausea, and loose stools x 3 months. He also noted chronic cough.      Workup for elevated LFTs including HIV testing.  Patient has been newly diagnosed with HIV.  Initial consult ID was deferred as patient's insurance requires that he received treatment at .  However, pathology from patient's recent outpatient colonoscopy was concerning for possible CMV.  ID was consulted for further recommendations to treat CMV, as well as other and infection prophylaxis, prior to patient being getting LLANOS therapy at  HIV clinic. ID and GI following.        HIV, newly diagnosed  Generalized weakness  Unintentional weight loss   -Patient's HIV antibody test came back positive today  -Patient MSM; however, family does not know and patient does not want them told yet  -Patient denies any IV drug  use, needlesticks, new sexual partners.  His fatigue and weight loss have been going on for several months.  He is unsure when he was exposed  -Patient does have a history of syphilis that was treated back in 2014.  No other known STIs  -STI panel negative.  -CD4 and absolute CD4 low at 5.7 and 23  -No respiratory issues and no history of swallowing problems or known oral ulcers.  -ID consulted for recommendations after pathology from outpt colonoscopy concerning for CMV. See below. Dr. Edward following.  -Started on Bactrim double strength PO daily for PCP prevention.   -Started on valganciclovir 900 mg PO BID for induction therapy, CMV ppx. Antiretroviral therapy to be started at .  -Discussed with Dr. Edward who would like his HIV clinic appointment moved up given patient's clinical status. Appointment for  HIV Clinic scheduled Monday, 2/3 at 9:30 AM for intake and Tuesday, 2/4 at 8 AM with Dr. Wilson. Appointments listed in AVS.   -Appointment with Dr. Rafael Brice for ophthalmology evaluation for retinitis  scheduled 2/5 @ 8:45  -Crypto Ag negative  -Histoplasma Ag pending  -PT and OT following. Rec for home with outpatient PT.     ?CMV vs herpes colitis  Colonic inflammation  - EGD 1/23/25 and colonoscopy 1/23/25 showed erythematous stomach mucosa and congested mucosa with ulcerations on the ileocecal valve  -pathology from outpatient colonoscopy concerning for possible CMV. Dr. Mccrary with [facility] recommended ID consult for IV antivirals/treatment prior to starting HIV therapy.  -CMV serologies positive, HSV stain pending  -Per Dr. Edward with ID, he will start valganciclovir for CMV ppx/induction therapy. Holding on additional CMV therapy pending further studies.   -GI panel positive for campylobacter- started on azithromycin        Elevated LFTs  Thrombocytopenia  -Prior cholecystectomy 2/2024  -GI consulted. Concern for infection given elevated inflammatory markers, elevated procal, and  no symptoms in spite of normal WBC and no fever on presentation. Patient now known to be immunocompromised which could explain lack of leukocytosis or true fever.   -ERCP 1/26 by Dr. Brunner with removal of stone, which was likely cause of symptoms. Patient improving.    -Completed 5 days of antibiotic therapy for presumed cholangitis on 1/29  -Bili has normalized. Per GI, residual elevated transaminases r/t HIV. AST and ALT both 92      Hemachromatosis  -pt has homozygous H63D mutation  - MRI abdomen 12/18/24 showed mild iron deposition in the liver with suggestion of hepatic steatosis.  Mild splenomegaly also noted.  -pt followed by Dr Myers with hem/onc.  - unfortunately pt has low iron saturation and iron as well as anemia, so he has been unable to undergo phlebotomy to lower his ferritin until December  -iron studies still show low iron, low iron sat and high ferritin   -worsening ferritin elevation this admit likely due to infection    -Echo with EF 56-60%, borderline LV concentric hypertrophy  -Patient would like to defer brain MRI at this time, this is reasonable given above reason for fatigue  -treat infection as above   -Pt has upcoming outpatient appointment with Dr. Myers on 2/27     GERD  - continue Protonix daily, avoid NSAIDs     DMII  HTN  - patient states he was taken off his diabetes (glipizide and metformin) and antihypertensive (amlodipine) medications one month ago by his PCP  - A1c 6  - diabetic diet recommended at [facility]     Depression and anxiety  - continue citalopram/ wellbutrin  - father concerned patient recently took oxycodone from his mother's home supply.  Patient says he had one dose earlier this week and it was only because of generalized pain.   -pt denies SI     Tobacco use  -NRT  -RX gum at [facility]      Discharge Follow Up Recommendations for outpatient labs/diagnostics:   PCP follow up 1-2 weeks  Follow up [facility] clinic 2/3 930am and 2/4 8am  Follow up [facility] 0845 2/5  Dr Myers follow up  on 2/27    Day of Discharge     HPI:   Tolerating diet, no further diarrhea. Normal BM today  Denies pain.    Review of Systems  Gen- No fevers, chills  CV- No chest pain, palpitations  Resp- No cough, dyspnea  GI- No N/V/D, abd pain      Vital Signs:   Temp:  [97 °F (36.1 °C)-98.7 °F (37.1 °C)] 98.2 °F (36.8 °C)  Heart Rate:  [73-84] 74  Resp:  [16-18] 18  BP: (114-138)/(52-81) 127/79      Physical Exam:  Constitutional: No acute distress, awake, alert, chronically ill appearing  HENT: NCAT, mucous membranes moist  Respiratory: Clear to auscultation bilaterally, respiratory effort normal   Cardiovascular: RRR, no murmurs, rubs, or gallops  Gastrointestinal: Positive bowel sounds, soft, nontender, nondistended  Musculoskeletal: No bilateral ankle edema  Psychiatric: Appropriate affect, cooperative  Neurologic: Oriented x 3, strength symmetric in all extremities, Cranial Nerves grossly intact to confrontation, speech clear  Skin: No rashes      Pertinent  and/or Most Recent Results     LAB RESULTS:      Lab 02/01/25  0500 01/30/25  1342 01/29/25  0104 01/28/25  0717 01/27/25  1114 01/27/25  0350 01/26/25  0843 01/25/25 2011 01/25/25  1510   WBC 3.10* 4.05 2.28* 2.22* 2.3*   < > 4.31  --  8.48   HEMOGLOBIN 9.8* 10.2* 9.5* 9.4* 10.1*   < > 9.9*  --  11.1*   HEMATOCRIT 30.6* 32.0* 29.9* 28.7* 30.8*   < > 30.6*  --  33.0*   PLATELETS 134* 144 120* 112* 115*   < > 93*  --  110*   NEUTROS ABS 1.88 2.67  --  1.50* 1.7  --  3.09  --  6.59   IMMATURE GRANS (ABS) 0.03 0.04  --  0.01  --   --  0.02  --  0.05   LYMPHS ABS 0.73 0.93  --  0.50* 0.4*  --  0.79  --  1.13   MONOS ABS 0.31 0.31  --  0.19 0.2  --  0.39  --  0.69   EOS ABS 0.13 0.08  --  0.02 0.0  --  0.01  --  0.01   MCV 93.3 93.3 92.6 92.6 93   < > 93.0  --  89.9   CRP  --   --   --   --   --   --  11.62*  --   --    PROCALCITONIN  --   --   --   --   --   --   --   --  7.37*   LACTATE  --   --   --   --   --   --  1.0 1.1 2.7*   PROTIME  --   --   --   --   --   --   14.5  --   --     < > = values in this interval not displayed.         Lab 01/30/25  1342 01/29/25  0104 01/28/25  0718 01/27/25  0350 01/26/25  0843 01/25/25  1510   SODIUM 134* 138 141 140 140 137   POTASSIUM 3.9 4.5 3.2* 4.2 3.4*  3.3* 3.7   CHLORIDE 100 105 107 107 106 102   CO2 22.0 24.0 22.0 22.0 21.0* 19.0*   ANION GAP 12.0 9.0 12.0 11.0 13.0 16.0*   BUN 14 15 21* 17 14 15   CREATININE 1.10 0.95 0.89 0.93 0.88 1.14   EGFR 77.3 92.2 98.7 94.6 99.1 74.1   GLUCOSE 117* 98 128* 244* 106* 277*   CALCIUM 8.9 8.6 8.5* 8.6 8.3* 8.8   MAGNESIUM  --  1.6 1.8 2.4  --  1.6   HEMOGLOBIN A1C  --   --   --   --  6.00*  --    TSH  --   --   --   --  0.816  --          Lab 01/30/25  1342 01/29/25  0104 01/28/25  0718 01/27/25  0350 01/26/25  0843 01/25/25  1510   TOTAL PROTEIN 6.1 6.1 5.9* 6.0 6.1 7.0   ALBUMIN 3.5 3.1* 3.0* 3.1* 3.2* 3.9   GLOBULIN 2.6 3.0 2.9  --  2.9 3.1   ALT (SGPT) 92* 70* 60* 86* 100* 136*   AST (SGOT) 92* 73* 45* 74* 120* 229*   BILIRUBIN 0.8 0.9 1.2 2.3* 3.8* 3.5*   INDIRECT BILIRUBIN  --   --   --  0.5  --   --    BILIRUBIN DIRECT  --   --   --  1.8* 3.7*  --    ALK PHOS 271* 272* 249* 290* 297* 379*         Lab 01/26/25  0843 01/25/25  1626 01/25/25  1510   HSTROP T  --  29* 31*   PROTIME 14.5  --   --    INR 1.12  --   --              Lab 01/26/25  0843   IRON 13*   IRON SATURATION (TSAT) 7*   TIBC 189*   TRANSFERRIN 127*   FERRITIN 2,807.00*   FOLATE 6.75   VITAMIN B 12 791         Brief Urine Lab Results  (Last result in the past 365 days)        Color   Clarity   Blood   Leuk Est   Nitrite   Protein   CREAT   Urine HCG        01/25/25 1456 Dark Yellow   Cloudy   Negative   Negative   Negative   >=300 mg/dL (3+)                 Microbiology Results (last 10 days)       Procedure Component Value - Date/Time    Gastrointestinal Panel, PCR - Stool, Per Rectum [[id number]]  (Abnormal) Collected: 01/30/25 1343    Lab Status: Final result Specimen: Stool from Per Rectum Updated: 01/30/25 1521      Campylobacter Detected     Plesiomonas shigelloides Not Detected     Salmonella Not Detected     Vibrio Not Detected     Vibrio cholerae Not Detected     Yersinia enterocolitica Not Detected     Enteroaggregative E. coli (EAEC) Not Detected     Enteropathogenic E. coli (EPEC) Not Detected     Enterotoxigenic E. coli (ETEC) lt/st Not Detected     Shiga-like toxin-producing E. coli (STEC) stx1/stx2 Not Detected     Shigella/Enteroinvasive E. coli (EIEC) Not Detected     Cryptosporidium Not Detected     Cyclospora cayetanensis Not Detected     Entamoeba histolytica Not Detected     Giardia lamblia Not Detected     Adenovirus F40/41 Not Detected     Astrovirus Not Detected     Norovirus GI/GII Not Detected     Rotavirus A Not Detected     Sapovirus (I, II, IV or V) Not Detected    Chlamydia trachomatis, Neisseria gonorrhoeae, PCR - Urine, Urine, Clean Catch [[id number]] Collected: 01/27/25 1110    Lab Status: Final result Specimen: Urine, Clean Catch Updated: 01/29/25 0610     Chlamydia trachomatis, VIKI Negative     Neisseria gonorrhoeae, VIKI Negative    Narrative:      Performed at:  [address] [place] [address]  [id number]  : Jade Gomes MD, Phone:  [phone]    Blood Culture - Blood, Arm, Left [[id number]]  (Normal) Collected: 01/25/25 2012    Lab Status: Final result Specimen: Blood from Arm, Left Updated: 01/31/25 0646     Blood Culture No growth at 5 days    Blood Culture - Blood, Arm, Right [[id number]]  (Normal) Collected: 01/25/25 2005    Lab Status: Final result Specimen: Blood from Arm, Right Updated: 01/31/25 0646     Blood Culture No growth at 5 days    Respiratory Panel PCR w/COVID-19(SARS-CoV-2) ERNESTO/LORRAINE/JEMMA/PAD/COR/ALYCIA In-House, NP Swab in UTM/VTM, 2 HR TAT - Swab, Nasopharynx [[id number]]  (Normal) Collected: 01/25/25 1506    Lab Status: Final result Specimen: Swab from Nasopharynx Updated: 01/25/25 1615     ADENOVIRUS, PCR Not Detected     Coronavirus 229E Not  Detected     Coronavirus HKU1 Not Detected     Coronavirus NL63 Not Detected     Coronavirus OC43 Not Detected     COVID19 Not Detected     Human Metapneumovirus Not Detected     Human Rhinovirus/Enterovirus Not Detected     Influenza A PCR Not Detected     Influenza B PCR Not Detected     Parainfluenza Virus 1 Not Detected     Parainfluenza Virus 2 Not Detected     Parainfluenza Virus 3 Not Detected     Parainfluenza Virus 4 Not Detected     RSV, PCR Not Detected     Bordetella pertussis pcr Not Detected     Bordetella parapertussis PCR Not Detected     Chlamydophila pneumoniae PCR Not Detected     Mycoplasma pneumo by PCR Not Detected    Narrative:      In the setting of a positive respiratory panel with a viral infection PLUS a negative procalcitonin without other underlying concern for bacterial infection, consider observing off antibiotics or discontinuation of antibiotics and continue supportive care. If the respiratory panel is positive for atypical bacterial infection (Bordetella pertussis, Chlamydophila pneumoniae, or Mycoplasma pneumoniae), consider antibiotic de-escalation to target atypical bacterial infection.            US Liver    Result Date: 1/27/2025  US LIVER Date of Exam: 1/27/2025 7:40 AM EST Indication: elevated bilirubin. Comparison: CT abdomen pelvis 1/25/2025 Technique: Grayscale and color Doppler ultrasound evaluation of the right upper quadrant was performed. Findings: Diffuse increased hepatic echogenicity. Mildly heterogeneous echotexture. No solid appearing liver lesions. Nonspecific dilation of main portal vein measuring 1.8 cm with otherwise hepatopetal flow. No visualized ascites. Cholecystectomy No intrahepatic duct dilation. The common bile duct measures maximally 3 mm, normal.. The visualized portions of the head and body of the pancreas are grossly unremarkable. The pancreatic tail is not seen due to bowel gas. Homogeneous cortical echotexture of the right kidney. No  hydronephrosis, shadowing echogenicities or solid masses.     1. No evidence of biliary cholestasis status post cholecystectomy. 2. Hepatic steatosis, likely marked severity. Mild parenchymal heterogeneity could reflect sequela of chronic hepatocellular dysfunction. 3. Nonspecific dilation of main portal vein with otherwise normal hepatopetal flow. Electronically Signed: Lul Burroughs MD  1/27/2025 8:23 AM EST  Workstation ID: [id number]    FL ERCP pancreatic and biliary ducts    Result Date: 1/27/2025  FL ERCP PANCREATIC AND BILIARY DUCTS Date of Exam: 1/26/2025 12:03 PM EST Indication: ENDOSCOPIC RETROGRADE CHOLANGIOPANCREATOGRAPHY.   Comparison: 2/2/2024 Technique:  A series of radiographic digital spot films were obtained in conjunction with an endoscopic catheterization of the biliary and pancreatic ductal system, performed by the gastroenterologist. Fluoroscopic Time: 38 seconds Number of Images: 2 Findings: Dictation is to record 38 seconds of fluoroscopy time during ERCP. 2 images obtained show endoscope and side cannula placement, contrast injection of the common duct and apparent balloon sweep. Please see the procedure report for full details.     Impression: Fluoroscopy provided during ERCP. Electronically Signed: Myron Means MD  1/27/2025 8:13 AM EST  Workstation ID: [id number]    CT Chest Without Contrast Diagnostic    Result Date: 1/25/2025  CT CHEST WO CONTRAST DIAGNOSTIC Date of Exam: 1/25/2025 3:53 PM EST Indication: ams. Comparison: None available. Technique: Axial CT images were obtained of the chest without contrast administration.  Reconstructed coronal and sagittal images were also obtained. Automated exposure control and iterative construction methods were used. FINDINGS: Scattered atelectasis is noted. No well-defined consolidations or pleural effusions are observed. Granulomas are noted in the left lower lobe. No suspicious pulmonary nodules or abnormal pulmonary masses are seen. No significant  hilar, mediastinal, or axillary lymphadenopathy is observed. Calcified left hilar lymph nodes are noted. A normal aortic arch branching pattern is identified. Coronary artery calcifications are identified. The thyroid gland is unremarkable. The esophagus is unremarkable. The limited evaluation of the upper abdomen demonstrates no evidence for acute abnormality. There is evidence for diffuse hepatic fatty infiltration with associated hepatosplenomegaly. No acute osseous abnormalities are observed.     1.No evidence for acute intrathoracic abnormality. 2.Coronary artery calcifications are noted. Electronically Signed: Andrew Tillman MD  1/25/2025 4:16 PM EST  Workstation ID: [id number]    CT Abdomen Pelvis Without Contrast    Result Date: 1/25/2025  CT ABDOMEN PELVIS WO CONTRAST Date of Exam: 1/25/2025 3:53 PM EST Indication: ams. Comparison: None available. Technique: Axial CT images were obtained of the abdomen and pelvis without the administration of contrast. Reconstructed coronal and sagittal images were also obtained. Automated exposure control and iterative construction methods were used. FINDINGS: Lung bases: Calcified left hilar lymph nodes. Left lower lobe calcified granulomata. Atheromatous disease of the coronary vessels. Liver: Geographic areas of decreased attenuation within the liver suggesting hepatic steatosis. Spleen: Splenic granulomata Pancreas:No pancreatic masses. No evidence of pancreatitis. Gallbladder and common bile duct: Previous cholecystectomy. Adrenal glands:No adrenal masses Kidneys and ureters:No kidney stones. No renal masses.No calculi present within the ureters. Normal caliber ureters. Urinary bladder:No urinary bladder wall thickening. No bladder masses. Small bowel:Normal caliber small bowel. Large bowel:No diverticulosis or diverticulitis. No large bowel masses are appreciated Appendix: Not seen however there is no evidence of appendicitis GENITOURINARY: Normal prostate Ascites or  pneumoperitoneum:None. Adenopathy:None present Osseous structures: The proximal femurs are intact. The pubic bones are intact. The sacrum and sacroiliac joints are normal. The spinous and transverse processes are intact. No rib fractures. Other findings: Fat-containing umbilical hernias.     1.No acute findings in the abdomen or pelvis. 2.Hepatic steatosis. 3.Fat-containing umbilical hernias. Electronically Signed: Stefano Garcia MD  1/25/2025 4:11 PM EST  Workstation ID: [id number]    CT Head Without Contrast    Result Date: 1/25/2025  CT HEAD WO CONTRAST Date of Exam: 1/25/2025 3:53 PM EST Indication: ams. Comparison: 12/15/2014 Technique: Axial CT images were obtained of the head without contrast administration.  Automated exposure control and iterative construction methods were used. Findings: Gray-white matter differentiation is maintained without evidence of an acute infarction. No intracranial mass or mass effect. No extra-axial mass or collection. The ventricles and sulci are normal in size and configuration. The posterior fossa appears normal. Sellar and suprasellar structures are normal. Orbital and paranasal soft tissues are normal. Opacification of the left maxillary sinus with mucoperiosteal reactive changes consistent with chronic sinusitis. The bony calvarium appears intact. No acute fractures. No lytic or blastic bony diseases.     Impression: No acute intracranial pathology. Electronically Signed: Stefano Garcia MD  1/25/2025 4:07 PM EST  Workstation ID: [id number]    XR Chest 1 View    Result Date: 1/24/2025  XR CHEST 1 VW Date of Exam: 1/24/2025 6:38 PM EST Indication: Chest Pain Triage Protocol Comparison: 1/10/2025 Findings: 3 cm lordotic projection. Heart size is at the upper limits of normal. Pulmonary vessels are normal. Lungs are clear. No pleural effusion. No pneumothorax.     Impression: 1. No acute cardiopulmonary disease. Electronically Signed: Elijah Kraft MD  1/24/2025 7:09 PM EST   Workstation ID: [id number]             Results for orders placed during the hospital encounter of 01/25/25    Adult Transthoracic Echo Complete W/ Cont if Necessary Per Protocol    Interpretation Summary    Left ventricular systolic function is normal. Calculated left ventricular EF = 57.5% Left ventricular ejection fraction appears to be 56 - 60%.    Left ventricular wall thickness is consistent with borderline concentric hypertrophy. Left ventricular diastolic function was normal.    The right ventricular cavity is borderline dilated with normal systolic function.    No hemodynamically significant valvular dysfunction.      Plan for Follow-up of Pending Labs/Results:   Pending Labs       Order Current Status    Ova & Parasite Examination - Stool, Per Rectum Collected (01/31/25 0743)    CMV DNA, Quantitative, PCR In process    Fungitell B-D Glucan In process    Histoplasma Ag Ur - Urine, Urinary Bladder In process    Ova & Parasite Examination - Stool, Per Rectum In process    Phosphatidylethanol In process    Stool Culture, Targeted - Stool, Per Rectum In process    Strongyloides Antibody IgG, MARTELL In process          Discharge Details        Discharge Medications        New Medications        Instructions Start Date   azithromycin 500 MG tablet  Commonly known as: ZITHROMAX   500 mg, Oral, Every 24 Hours Scheduled      nicotine polacrilex 2 MG gum  Commonly known as: NICORETTE   2 mg, Mouth/Throat, As Needed      polyethylene glycol 17 g packet  Commonly known as: MIRALAX   17 g, Oral, Daily      sulfamethoxazole-trimethoprim 800-160 MG per tablet  Commonly known as: BACTRIM DS,SEPTRA DS   1 tablet, Oral, Every 24 Hours Scheduled      valGANciclovir 450 MG tablet  Commonly known as: VALCYTE   900 mg, Oral, Every 12 Hours Scheduled             Continue These Medications        Instructions Start Date   accu-chek soft touch lancets   Used to test blood sugar for Diabetes E11.65 test up to twice a day      buPROPion  " MG 24 hr tablet  Commonly known as: WELLBUTRIN XL   150 mg, Oral, Daily      citalopram 20 MG tablet  Commonly known as: CeleXA   20 mg, Oral, Daily      FreeStyle Dagoberto 2 [place] device   1 Device, Not Applicable, Take As Directed      FreeStyle Dagoberto 2 Sensor misc   1 Device, Not Applicable, Every 14 Days      glucose blood test strip   Use as instructed      Insulin Syringe-Needle U-100 28G X 5/16\" 1 ML misc   1 Device, Not Applicable, 2 Times Daily      RELION INSULIN SYRINGE 1ML/31G 31G X 5/16\" 1 ML misc  Generic drug: Insulin Syringe-Needle U-100   2 Times Daily      omeprazole 20 MG capsule  Commonly known as: priLOSEC   20 mg, Oral, Daily             Stop These Medications      metFORMIN  MG 24 hr tablet  Commonly known as: GLUCOPHAGE-XR              Allergies   Allergen Reactions    Shellfish Allergy Anaphylaxis    Shellfish-Derived Products Anaphylaxis    Codeine Nausea Only     Not sure of reaction but thinks it is just nausea         Discharge Disposition:  Home or Self Care    Diet:  Hospital:  Diet Order   Procedures    Diet: Diabetic, Gastrointestinal; Consistent Carbohydrate; Fat-Restricted; Fluid Consistency: Thin (IDDSI 0)       Diet Instructions       Diet: Diabetic Diets; Consistent Carbohydrate; Thin (IDDSI 0)      Discharge Diet: Diabetic Diets    Diabetic Diet: Consistent Carbohydrate    Fluid Consistency: Thin (IDDSI 0)             Activity:  Activity Instructions       Activity as Tolerated              Restrictions or Other Recommendations:         CODE STATUS:    There are no questions and answers to display.       Future Appointments   Date Time Provider Department Center   2/5/2025  4:15 PM Sita Chase APRN [facility]   2/27/2025 11:30 AM Robert Myers MD [facility]       Additional Instructions for the Follow-ups that You Need to Schedule       Ambulatory Referral to Physical Therapy for Evaluation & Treatment   As directed      Specialty needed: Evaluate " and treat   Follow-up needed: Yes        Discharge Follow-up with PCP   As directed       Currently Documented PCP:    Sita Chase APRN    PCP Phone Number:    [phone]     Follow Up Details: 1--2 weels        Discharge Follow-up with Specified Provider:  Monday 8 am and 930a tuesday   As directed      To:  Monday 8 am and 930a tuesday        Discharge Follow-up with Specified Provider: dr. mares 2/27   As directed      To: dr. mares 2/27                      JOANA Caballero  02/01/25      Time Spent on Discharge:  I spent  40  minutes on this discharge activity which included: face-to-face encounter with the patient, reviewing the data in the system, coordination of the care with the nursing staff as well as consultants, documentation, and entering orders.        Electronically signed by JOANA Caballero, 02/01/25, 10:10 AM EST.      Electronically signed by Nathan Varma MD at 02/01/25 [id number]

## 2025-03-06 NOTE — ASU DISCHARGE PLAN (ADULT/PEDIATRIC) - FINANCIAL ASSISTANCE
[facility] provides services at a reduced cost to those who are determined to be eligible through [facility]’s financial assistance program. Information regarding [facility]’s financial assistance program can be found by going to [email or web address] or by calling [phone].

## 2025-03-06 NOTE — BRIEF OPERATIVE NOTE - NSICDXBRIEFPREOP_GEN_ALL_CORE_FT
PRE-OP DIAGNOSIS:  HOLGER (stress urinary incontinence, female) 06-Mar-2025 15:19:22  Esther Izaguirre

## 2025-03-06 NOTE — CHART NOTE - NSCHARTNOTEFT_GEN_A_CORE
PACU ANESTHESIA ADMISSION NOTE      Procedure: EUA, midurethal sling, cystoscopy  ____  Intubated  TV:______       Rate: ______      FiO2: ______    ___x_  Patent Airway    __x__  Full return of protective reflexes    __x__  Full recovery from anesthesia / back to baseline   bp 121/57 hr 62 rr 20 t 98.5 o2 sat 97      Mental Status:  __ Awake   _____ Alert   __x___ Drowsy   _____ Sedated    Nausea/Vomiting:  __x__ NO  ______Yes,   See Post - Op Orders          Pain Scale (0-10):  _0____    Treatment: ____ None    ____ See Post - Op/PCA Orders    Post - Operative Fluids:   ____ Oral   ___x_ See Post - Op Orders    Plan: Discharge:   __x__Home       _____Floor     _____Critical Care    _____  Other:_________________    Comments:

## 2025-03-06 NOTE — ASU DISCHARGE PLAN (ADULT/PEDIATRIC) - CARE PROVIDER_API CALL
Esther Izaguirre  Obstetrics and Gynecology  [address] [place] [id number]  Phone: [phone]  Fax: [phone]  Follow Up Time:

## 2025-03-06 NOTE — BRIEF OPERATIVE NOTE - NSICDXBRIEFPOSTOP_GEN_ALL_CORE_FT
POST-OP DIAGNOSIS:  HOLGER (stress urinary incontinence, female) 06-Mar-2025 15:19:42  Esther Izaguirre

## 2025-03-06 NOTE — BRIEF OPERATIVE NOTE - NSICDXBRIEFPROCEDURE_GEN_ALL_CORE_FT
PROCEDURES:  Creation, sling, midurethral, female 06-Mar-2025 15:18:58  Esther Izaguirre  Cystoscopy 06-Mar-2025 15:19:05  Esther Izaguirre

## 2025-03-06 NOTE — ASU DISCHARGE PLAN (ADULT/PEDIATRIC) - ASU DC SPECIAL INSTRUCTIONSFT
DIET  - You may resume your normal diet. Eat a well-balanced diet. You may prefer to eat light meals for the first few days after surgery.  Drink plenty of water (6-8 glasses a day).      ACTIVITY:   - No heavy lifting/pushing/pulling for 6 weeks. Do not lift anything more than 10 lbs (such as laundry, groceries, children, pets), vacuum, push heavy doors or grocery carts, etc, for 6 weeks.  - You may climb stairs as tolerated.  -  Do not put anything in the vagina for at least 6 weeks after surgery unless otherwise instructed by your doctor (including tampons, douching, sexual intercourse, etc).  -  Avoid sitting or lying in bed for more than 2 hours at a time while you are awake to reduce your risk of blood clots.    WOUND CARE: You have 2 incisions that are covered with surgical glue (Dermabond) After 24 hours you may get your incisions wet.  Do not submerge in water (no tub baths or pools), showering is OK.  The Dermabond will loosen from the skin and fall off in 5 to 10 days.  Do not apply any ointments, lotions, creams or tape over the Dermabond.    PAIN MANAGEMENT:   Alternate Tylenol and ibuprofen/Motrin (if you are eligible). Each of these medications can be taken every six hours. Try to stagger them so that you are taking something for pain every three hours (ex. Take Motrin at 12:00, Tylenol at 3:00, Motrin at 6:00, etc.) to maximize pain relief.  - Tylenol – 975 every 6 hours as needed. The maximum dose of Tylenol is 4000 mg in 24 hours.  - Motrin/Ibuprofen - 600 mg every 6 hours as needed (try to take with food). The maximum dose of Motrin/ibuprofen is 2400mg in 24 hours  -  A warm shower, heating pad, and/or walking may help.    WHAT TO EXPECT AT HOME  - Recovery from surgery is generally 2-4 weeks, but sometimes longer for more strenuous activity. It is normal to be very tired during this time.  - It is normal to have some drainage or a small amount of vaginal bleeding after surgery that would require the use of a light pantiliner. This discharge may last up to 8 weeks. The bleeding and discharge should be light and should have no odor.  - You may experience gas pain, abdominal swelling, or shoulder pain for 24-72 hours after surgery. This is from the carbon dioxide gas put into your abdomen to better visualize your organs. A warm shower, heating pad, and/or walking may help.    WHEN TO CALL YOUR DOCTOR:  - Fever (>100.4°F or 38.0°C) or chills  - Incision problems such as redness, warmth, swelling, or foul smelling drainage.  - Severe nausea or persistent vomiting.  - Bright red vaginal bleeding (soaking >1 pad/hour) or foul smelling vaginal drainage.  - Severe pain not relieved with pain medication.  - Pain with urination, cloudy urine, or foul smelling urine.  - Or if you have any other problems or questions.

## 2025-03-06 NOTE — ASU DISCHARGE PLAN (ADULT/PEDIATRIC) - NS MD DC FALL RISK RISK
For information on Fall & Injury Prevention, visit: [email or web address] OR  [email or web address] OR  https://www.cdc.gov/steadi/patient.html

## 2025-03-13 DIAGNOSIS — N39.3 STRESS INCONTINENCE (FEMALE) (MALE): ICD-10-CM

## 2025-03-13 DIAGNOSIS — N81.4 UTEROVAGINAL PROLAPSE, UNSPECIFIED: ICD-10-CM

## 2025-03-17 LAB — BACTERIA UR CULT: NORMAL

## 2025-03-21 ENCOUNTER — APPOINTMENT (OUTPATIENT)
Dept: UROGYNECOLOGY | Facility: CLINIC | Age: 45
End: 2025-03-21
Payer: COMMERCIAL

## 2025-03-21 VITALS
BODY MASS INDEX: 24.91 KG/M2 | DIASTOLIC BLOOD PRESSURE: 75 MMHG | HEART RATE: 81 BPM | SYSTOLIC BLOOD PRESSURE: 115 MMHG | HEIGHT: 66 IN | WEIGHT: 155 LBS

## 2025-03-21 DIAGNOSIS — Z09 ENCOUNTER FOR FOLLOW-UP EXAMINATION AFTER COMPLETED TREATMENT FOR CONDITIONS OTHER THAN MALIGNANT NEOPLASM: ICD-10-CM

## 2025-03-21 PROCEDURE — 99024 POSTOP FOLLOW-UP VISIT: CPT

## 2025-03-21 PROCEDURE — 51701 INSERT BLADDER CATHETER: CPT | Mod: 58

## 2025-03-24 LAB
APPEARANCE: CLEAR
BILIRUBIN URINE: NEGATIVE
BLOOD URINE: NEGATIVE
COLOR: YELLOW
GLUCOSE QUALITATIVE U: NEGATIVE MG/DL
KETONES URINE: NEGATIVE MG/DL
LEUKOCYTE ESTERASE URINE: NEGATIVE
NITRITE URINE: NEGATIVE
PH URINE: 6.5
PROTEIN URINE: NEGATIVE MG/DL
SPECIFIC GRAVITY URINE: 1
URINE CULTURE <10: NORMAL
UROBILINOGEN URINE: 0.2 MG/DL

## 2025-05-28 ENCOUNTER — APPOINTMENT (OUTPATIENT)
Dept: UROGYNECOLOGY | Facility: CLINIC | Age: 45
End: 2025-05-28

## 2025-06-16 ENCOUNTER — APPOINTMENT (OUTPATIENT)
Dept: UROGYNECOLOGY | Facility: CLINIC | Age: 45
End: 2025-06-16
Payer: COMMERCIAL

## 2025-06-16 VITALS
BODY MASS INDEX: 24.91 KG/M2 | WEIGHT: 155 LBS | SYSTOLIC BLOOD PRESSURE: 112 MMHG | HEART RATE: 69 BPM | HEIGHT: 66 IN | DIASTOLIC BLOOD PRESSURE: 73 MMHG

## 2025-06-16 PROBLEM — M62.89 HIGH-TONE PELVIC FLOOR DYSFUNCTION: Status: ACTIVE | Noted: 2025-06-16

## 2025-06-16 PROCEDURE — 99459 PELVIC EXAMINATION: CPT

## 2025-06-16 PROCEDURE — G2211 COMPLEX E/M VISIT ADD ON: CPT | Mod: NC

## 2025-06-16 PROCEDURE — 99214 OFFICE O/P EST MOD 30 MIN: CPT

## 2025-06-27 RX ORDER — CYCLOBENZAPRINE HYDROCHLORIDE 5 MG/1
5 TABLET, FILM COATED ORAL
Qty: 60 | Refills: 1 | Status: ACTIVE | COMMUNITY
Start: 2025-06-16 | End: 1900-01-01

## 2025-08-25 ENCOUNTER — RX RENEWAL (OUTPATIENT)
Age: 45
End: 2025-08-25

## 2025-09-16 ENCOUNTER — APPOINTMENT (OUTPATIENT)
Dept: UROGYNECOLOGY | Facility: CLINIC | Age: 45
End: 2025-09-16
Payer: COMMERCIAL

## 2025-09-16 VITALS
DIASTOLIC BLOOD PRESSURE: 63 MMHG | HEART RATE: 76 BPM | BODY MASS INDEX: 24.91 KG/M2 | WEIGHT: 155 LBS | HEIGHT: 66 IN | SYSTOLIC BLOOD PRESSURE: 97 MMHG

## 2025-09-16 DIAGNOSIS — N39.3 STRESS INCONTINENCE (FEMALE) (MALE): ICD-10-CM

## 2025-09-16 DIAGNOSIS — N81.10 CYSTOCELE, UNSPECIFIED: ICD-10-CM

## 2025-09-16 DIAGNOSIS — Z87.898 PERSONAL HISTORY OF OTHER SPECIFIED CONDITIONS: ICD-10-CM

## 2025-09-16 DIAGNOSIS — N94.10 UNSPECIFIED DYSPAREUNIA: ICD-10-CM

## 2025-09-16 DIAGNOSIS — Z09 ENCOUNTER FOR FOLLOW-UP EXAMINATION AFTER COMPLETED TREATMENT FOR CONDITIONS OTHER THAN MALIGNANT NEOPLASM: ICD-10-CM

## 2025-09-16 DIAGNOSIS — M62.89 OTHER SPECIFIED DISORDERS OF MUSCLE: ICD-10-CM

## 2025-09-16 DIAGNOSIS — N39.41 URGE INCONTINENCE: ICD-10-CM

## 2025-09-16 PROCEDURE — 99214 OFFICE O/P EST MOD 30 MIN: CPT | Mod: 25

## 2025-09-16 PROCEDURE — 99459 PELVIC EXAMINATION: CPT | Mod: NC

## 2025-09-16 RX ORDER — CYCLOBENZAPRINE 10 MG/1
10 TABLET ORAL
Qty: 60 | Refills: 3 | Status: ACTIVE | COMMUNITY
Start: 2025-09-16 | End: 1900-01-01

## 2025-09-19 PROBLEM — N39.3 SUI (STRESS URINARY INCONTINENCE, FEMALE): Status: RESOLVED | Noted: 2024-03-21 | Resolved: 2025-09-19

## 2025-09-19 PROBLEM — Z87.898 HISTORY OF URINARY FREQUENCY: Status: RESOLVED | Noted: 2024-10-25 | Resolved: 2025-09-19

## 2025-09-19 PROBLEM — Z87.898 HISTORY OF URINARY URGENCY: Status: RESOLVED | Noted: 2024-03-21 | Resolved: 2025-09-19

## 2025-09-19 PROBLEM — N39.41 URGE INCONTINENCE OF URINE: Status: RESOLVED | Noted: 2024-07-15 | Resolved: 2025-09-19

## 2025-09-19 PROBLEM — Z09 POSTOPERATIVE EXAMINATION: Status: RESOLVED | Noted: 2025-03-21 | Resolved: 2025-09-19
